# Patient Record
Sex: FEMALE | Race: WHITE
[De-identification: names, ages, dates, MRNs, and addresses within clinical notes are randomized per-mention and may not be internally consistent; named-entity substitution may affect disease eponyms.]

---

## 2019-08-13 NOTE — RADIOLOGY REPORT (SQ)
EXAM DESCRIPTION:

XR ANKLE 2 VIEWS



COMPLETED DATE/TME:  08/13/2019 00:00



CLINICAL HISTORY:

34 years Female, bone pain



COMPARISON:

None.



Findings:



Acute oblique fracture of the distal diametaphysis of the right

fibula with 0.3 cm lateral displacement. Mild diffuse right ankle

swelling.  Bones, joints, and soft tissues of the RIGHT XR ANKLE

2 VIEWS appear otherwise unremarkable.



IMPRESSION:

Acute oblique fracture of the distal diametaphysis of the right

fibula with 0.3 cm lateral displacement. Mild diffuse right ankle

swelling.

## 2019-08-13 NOTE — ER DOCUMENT REPORT
ED Extremity Problem, Lower





- General


Chief Complaint: Ankle Pain


Stated Complaint: ANKLE PAIN


Time Seen by Provider: 08/13/19 07:40


Notes: 





34-year-old female presents ER complaint of right ankle pain.  The patient broke

her ankle in the spring.  It is healed she is recently moved from [place].  

The patient started work and is been working 2 weeks and after work her ankle 

has been bothering her.  She had no specific injury or trauma describes it as 

aching.  It is worse with walking and movement better with rest and elevation.  

Denies any numbness or tingling.  Rates the pain is severe at times


TRAVEL OUTSIDE OF THE [place] IN LAST 30 DAYS: No





- Related Data


Allergies/Adverse Reactions: 


                                        





No Known Allergies Allergy (Unverified 08/13/19 07:53)


   











Past Medical History





- Social History


Smoking Status: Unknown if Ever Smoked


Family History: None


Patient has suicidal ideation: No


Patient has homicidal ideation: No


Renal/ Medical History: Denies: Hx Peritoneal Dialysis





Review of Systems





- Review of Systems


Constitutional: No symptoms reported


Gastrointestinal: No symptoms reported


Genitourinary: No symptoms reported


Musculoskeletal: No symptoms reported, Joint pain


Skin: No symptoms reported


Neurological/Psychological: denies: Headaches


-: Yes All other systems reviewed and negative





Physical Exam





- Vital signs


Vitals: 





                                        











Temp Pulse Resp BP Pulse Ox


 


 98 F   63   16   124/74   99 


 


 08/13/19 06:42  08/13/19 06:42  08/13/19 06:42  08/13/19 06:42  08/13/19 06:42














- Notes


Notes: 





GENERAL_APPEARANCE: well_nourished, alert, cooperative, no_acute_distress, 

no_obvious_discomfort.


 VITALS: reviewed, see vital signs table.


 HEAD: no_swelling\tenderness on the head.


 EYES:conjunctiva_clear.


 NOSE: no_nasal_discharge.


 MOUTH: (-)decreased moisture.


  EXTREMITIES: Right ankle does appear significant swollen there is a little bit

of puffiness around the right lateral malleolus.  Dorsalis pedis pulses are 

noted brisk capillary refill.


 SKIN: warm, dry, good_color, no_rash.


 MENTAL_STATUS: speech_clear, oriented_X_3, normal_affect, 

responds_appropriately to questions.








Course





- Re-evaluation


Re-evalutation: 





08/13/19 08:22


Patient has a history of fracture looks as if she has refractured along the old 

fracture line.  We will place her in a stirrup splint she has a orthopedic 

walking boot already I have her referred to orthopedics.  Will have her follow-

up with Ortho nonweightbearing with crutches until orthopedics sees her.





- Vital Signs


Vital signs: 





                                        











Temp Pulse Resp BP Pulse Ox


 


 98 F   63   16   124/74   99 


 


 08/13/19 06:42  08/13/19 06:42  08/13/19 06:42  08/13/19 06:42  08/13/19 06:42














- Diagnostic Test


Radiology reviewed: Reports reviewed


Radiology results interpreted by me: 





08/13/19 08:16





                                        





Ankle X-Ray  08/13/19 00:00


IMPRESSION:


Acute oblique fracture of the distal diametaphysis of the right


fibula with 0.3 cm lateral displacement. Mild diffuse right ankle


swelling.  


 














Discharge





- Discharge


Clinical Impression: 


Fracture of distal fibula


Qualifiers:


 Encounter type: initial encounter Fracture type: closed Fracture morphology: 

other fracture Laterality: right Qualified Code(s): S82.831A - Other fracture of

upper and lower end of right fibula, initial encounter for closed fracture





Condition: Good


Disposition: HOME, SELF-CARE


Instructions:  Fracture of Distal Fibula ([facility])


Prescriptions: 


Hydrocodone/Acetaminophen [Norco 5-325 mg Tablet] 1 tab PO TID PRN #12 tablet


 PRN Reason: Pain Scale Of 5


Forms:  Return to Work


Referrals: 


MACY PRESTON DO [ACTIVE STAFF] - Follow up as needed

## 2020-05-13 ENCOUNTER — HOSPITAL ENCOUNTER (EMERGENCY)
Dept: HOSPITAL 62 - ER | Age: 35
Discharge: HOME | End: 2020-05-13
Payer: SELF-PAY

## 2020-05-13 VITALS — SYSTOLIC BLOOD PRESSURE: 104 MMHG | DIASTOLIC BLOOD PRESSURE: 64 MMHG

## 2020-05-13 DIAGNOSIS — Z87.891: ICD-10-CM

## 2020-05-13 DIAGNOSIS — R53.1: ICD-10-CM

## 2020-05-13 DIAGNOSIS — Z79.3: ICD-10-CM

## 2020-05-13 DIAGNOSIS — N92.1: ICD-10-CM

## 2020-05-13 DIAGNOSIS — D62: Primary | ICD-10-CM

## 2020-05-13 DIAGNOSIS — Z79.899: ICD-10-CM

## 2020-05-13 LAB
ADD MANUAL DIFF: NO
ALBUMIN SERPL-MCNC: 3 G/DL (ref 3.5–5)
ALP SERPL-CCNC: 38 U/L (ref 38–126)
ANION GAP SERPL CALC-SCNC: 3 MMOL/L (ref 5–19)
AST SERPL-CCNC: 30 U/L (ref 14–36)
BASOPHILS # BLD AUTO: 0.1 10^3/UL (ref 0–0.2)
BASOPHILS NFR BLD AUTO: 0.9 % (ref 0–2)
BILIRUB DIRECT SERPL-MCNC: 0 MG/DL (ref 0–0.4)
BILIRUB SERPL-MCNC: 0.2 MG/DL (ref 0.2–1.3)
BUN SERPL-MCNC: 13 MG/DL (ref 7–20)
CALCIUM: 8.3 MG/DL (ref 8.4–10.2)
CHLORIDE SERPL-SCNC: 109 MMOL/L (ref 98–107)
CO2 SERPL-SCNC: 21 MMOL/L (ref 22–30)
EOSINOPHIL # BLD AUTO: 0.1 10^3/UL (ref 0–0.6)
EOSINOPHIL NFR BLD AUTO: 0.9 % (ref 0–6)
ERYTHROCYTE [DISTWIDTH] IN BLOOD BY AUTOMATED COUNT: 13.6 % (ref 11.5–14)
GLUCOSE SERPL-MCNC: 90 MG/DL (ref 75–110)
HCT VFR BLD CALC: 19.2 % (ref 36–47)
HGB BLD-MCNC: 6.7 G/DL (ref 12–15.5)
LYMPHOCYTES # BLD AUTO: 2.2 10^3/UL (ref 0.5–4.7)
LYMPHOCYTES NFR BLD AUTO: 32.2 % (ref 13–45)
MCH RBC QN AUTO: 31.8 PG (ref 27–33.4)
MCHC RBC AUTO-ENTMCNC: 35 G/DL (ref 32–36)
MCV RBC AUTO: 91 FL (ref 80–97)
MONOCYTES # BLD AUTO: 0.3 10^3/UL (ref 0.1–1.4)
MONOCYTES NFR BLD AUTO: 5 % (ref 3–13)
NEUTROPHILS # BLD AUTO: 4.1 10^3/UL (ref 1.7–8.2)
NEUTS SEG NFR BLD AUTO: 61 % (ref 42–78)
PLATELET # BLD: 221 10^3/UL (ref 150–450)
POTASSIUM SERPL-SCNC: 4.1 MMOL/L (ref 3.6–5)
PROT SERPL-MCNC: 5.7 G/DL (ref 6.3–8.2)
RBC # BLD AUTO: 2.12 10^6/UL (ref 3.72–5.28)
TOTAL CELLS COUNTED % (AUTO): 100 %
WBC # BLD AUTO: 6.7 10^3/UL (ref 4–10.5)

## 2020-05-13 PROCEDURE — 86850 RBC ANTIBODY SCREEN: CPT

## 2020-05-13 PROCEDURE — 99291 CRITICAL CARE FIRST HOUR: CPT

## 2020-05-13 PROCEDURE — 80053 COMPREHEN METABOLIC PANEL: CPT

## 2020-05-13 PROCEDURE — 86920 COMPATIBILITY TEST SPIN: CPT

## 2020-05-13 PROCEDURE — 84703 CHORIONIC GONADOTROPIN ASSAY: CPT

## 2020-05-13 PROCEDURE — 76830 TRANSVAGINAL US NON-OB: CPT

## 2020-05-13 PROCEDURE — 85025 COMPLETE CBC W/AUTO DIFF WBC: CPT

## 2020-05-13 PROCEDURE — 36415 COLL VENOUS BLD VENIPUNCTURE: CPT

## 2020-05-13 PROCEDURE — 36430 TRANSFUSION BLD/BLD COMPNT: CPT

## 2020-05-13 PROCEDURE — 86901 BLOOD TYPING SEROLOGIC RH(D): CPT

## 2020-05-13 PROCEDURE — 86900 BLOOD TYPING SEROLOGIC ABO: CPT

## 2020-05-13 PROCEDURE — P9016 RBC LEUKOCYTES REDUCED: HCPCS

## 2020-05-13 PROCEDURE — 93976 VASCULAR STUDY: CPT

## 2020-05-13 NOTE — PDOC CONSULTATION
Consultation


Consult Date: 20


Provider Consulted: JAMIE HERNANDEZ





History of Present Illness


History of Present Illness: 


BRANT RUSSO is a 35 year old female


with menorrhagia,severe. She reports having this issue for approximately 3 

years. She just arrive here in last week for [place] and was getting work up 

and treatment there with a GYN. She reports they had done and EMB which was 

negative. THey had placed her on COCPs and she was given instructions for 

tapering dose of 3 tabs per day/2 tabs per day/1 tab daily as needed for heavy 

vaginal bleeding. SHe has required transfusion previously for this problem and 

last transfusion was 2019. She states she take the OCP daily when not on a 

taper. She moved her last week and w/ all the changes and move activities: she 

forgot her OCP tablets and began bleeding Thursday (6 days ago). Bleeding became

heavy 4 days ago so she began her OCP taper. This helped until today and 

bleeding 15-20 pad over last 24 hrs with large clots. This is what brought her 

to the ED. She denies pain. Denies hx of abnormal PAP and reports is up to date 

on cervical cancer screening. She is  with hx of CS and Tubal ligation at 

last CS. 


Feels tired, weak and dizzy today but no syncopal episodes.





Social History


Smoking Status: Former Smoker





Family History


Family History: None


Parental Family History Reviewed: Yes


Children Family History Reviewed: Yes


Sibling(s) Family History Reviewed.: Yes





Medication/Allergy


Home Medications: 








Hydrocodone/Acetaminophen [Norco 5-325 mg Tablet] 1 tab PO TID PRN #12 tablet 

19 


Docusate Sodium [Colace] 100 mg PO Q12 30 Days #60 capsule 20 


Ferrous Sulfate [Gabriel-Time] 325 mg PO BID 30 Days #60 tablet 20 


Tranexamic Acid [Lysteda] 1,300 mg PO Q8 5 Days #30 tablet 20 








Allergies/Adverse Reactions: 


                                        





No Known Allergies Allergy (Verified 20 06:53)


   











Review of Systems


Constitutional: ABSENT: chills, fever(s), headache(s), weight gain, weight loss


Cardiovascular: ABSENT: chest pain, dyspnea on exertion, edema, orthropnea, 

palpitations


Respiratory: ABSENT: cough, hemoptysis


Gastrointestinal: ABSENT: abdominal pain, constipation, diarrhea, hematemesis, 

hematochezia, nausea, vomiting


Neurological: ABSENT: abnormal gait, abnormal speech, confusion, dizziness, 

focal weakness, syncope


Psychiatric: ABSENT: anxiety, depression, homidical ideation, suicidal ideation


Endocrine: ABSENT: cold intolerance, heat intolerance, polydipsia, polyuria


Hematologic/Lymphatic: ABSENT: easy bleeding, easy bruising





Physical Exam





- Physical Exam


Vital Signs: 


                                        











Temp Pulse Resp BP Pulse Ox


 


 97.9 F   78   21 H  101/58 L  100 


 


 20 11:05  20 10:42  20 11:05  20 11:05  20 11:05








                                 Intake & Output











 20





 06:59 06:59 06:59


 


Intake Total   1000


 


Balance   1000


 


Weight  88.451 kg 











General appearance: PRESENT: no acute distress, cooperative


Respiratory exam: PRESENT: clear to auscultation sukhi


Cardiovascular exam: PRESENT: RRR, +S1, +S2


GI/Abdominal exam: PRESENT: soft


Extremities exam: PRESENT: full ROM.  ABSENT: calf tenderness, clubbing, pedal 

edema


Neurological exam: PRESENT: alert, awake, oriented to person, oriented to place,

 oriented to time


Psychiatric exam: PRESENT: appropriate affect


Skin exam: PRESENT: dry, warm





Result


Laboratory Results: 


                                        





                                 20 07:57 





                                 20 07:57 





                                        











  20





  07:57 07:57 07:57


 


WBC  6.7  


 


RBC  2.12 L  


 


Hgb  6.7 L  


 


Hct  19.2 L  


 


MCV  91  


 


MCH  31.8  


 


MCHC  35.0  


 


RDW  13.6  


 


Plt Count  221  


 


Seg Neutrophils %  61.0  


 


Sodium   133.4 L 


 


Potassium   4.1 


 


Chloride   109 H 


 


Carbon Dioxide   21 L 


 


Anion Gap   3 L 


 


BUN   13 


 


Creatinine   0.75 


 


Est GFR ( Amer)   > 60 


 


Glucose   90 


 


Calcium   8.3 L 


 


Total Bilirubin   0.2 


 


AST   30 


 


Alkaline Phosphatase   38 


 


Total Protein   5.7 L 


 


Albumin   3.0 L 


 


Serum HCG, Qual   


 


Blood Type    O POSITIVE


 


Antibody Screen    NEGATIVE














  20





  07:57


 


WBC 


 


RBC 


 


Hgb 


 


Hct 


 


MCV 


 


MCH 


 


MCHC 


 


RDW 


 


Plt Count 


 


Seg Neutrophils % 


 


Sodium 


 


Potassium 


 


Chloride 


 


Carbon Dioxide 


 


Anion Gap 


 


BUN 


 


Creatinine 


 


Est GFR (African Amer) 


 


Glucose 


 


Calcium 


 


Total Bilirubin 


 


AST 


 


Alkaline Phosphatase 


 


Total Protein 


 


Albumin 


 


Serum HCG, Qual  NEGATIVE


 


Blood Type 


 


Antibody Screen 











Impressions: 


                                        





Transvaginal US  20 07:11


IMPRESSION:  1.  The endometrial canal is distended and filled with 

heterogeneous echogenic material and on Doppler there is evidence of a vascular 

pedicle that extends into the endometrial canal.  Differential considerations to

 consider include an endometrial polyp, an AVM, and in the proper clinical 

setting retained products of conception.


2.  Other findings as detailed above.


 














Assessment & Plan





- Diagnosis


(1) Menorrhagia


Qualifiers: 


   Menorrhagia type: with irregular cycle   Qualified Code(s): N92.1 - Excessive

 and frequent menstruation with irregular cycle   


Is this a current diagnosis for this admission?: Yes   





- Time


Critical Time spent with patient: 15-24 minutes


Medications reviewed and adjusted accordingly: Yes


Anticipated discharge: Home - 36 yo  with menorrhagia/metrorrhagia  -VSS,

 pulse normal and b/p normal -Exam with small amount of blood on pad and on clot

 the size of  quarter. No pain on palpation -Serum HCG negative -US showed 

heterogeneous material in cavity of uterus, likely blood. Doppler showed vessel 

in area which is questionable for polyp/fibroid.  -Discussed AUB : Menorrhagia 

iwth patient and causes. She has undergone EMB negative per patient. Discussed 

use of tranexamic acid for next 5 days PO. SHe is hemodynamically stable and no 

contraindicaitons. Risks and benefits reviewed. After 5 days, resume daily OCP\ 

D/c possible out patient hysteroscopy with possible D&C, myosure and endometrial

 ablation.  Recommend she take Iron 325 mg BID and colace 100 mg BID for next 30

 days.  Recommend f/u in my office [facility] with in next week. -Rxs sent.  -D/c with 

Oral [facility]





- Plan Summary


Plan Summary: 





As above

## 2020-05-13 NOTE — RADIOLOGY REPORT (SQ)
EXAM DESCRIPTION:  U/S NON OB PEL TV W/DOPPLER



IMAGES COMPLETED DATE/TIME:  5/13/2020 9:26 am



REASON FOR STUDY:  vaginal bleeding



COMPARISON:  None.



TECHNIQUE:  Dynamic and static grayscale images acquired of the pelvis via transvaginal approach and 
recorded on PACS. Additional selected color Doppler and spectral images recorded.



LIMITATIONS:  None.



FINDINGS:  LMP:

UTERUS: The uterus measures 7.7 x 4.9 x 4.8 cm.  The uterus is oriented retroverted.  The myometrium 
is heterogeneous.

ENDOMETRIAL STRIPE: The endometrial canal is distended and filled with heterogeneous echogenic materi
al.  On Doppler there is evidence of vascular pedicle that extends into the endometrial canal.

CERVIX: The cervix measures 2.7 cm in length.

RIGHT OVARY AND DOPPLER: The right ovary measures 1.9 x 1.8 x 0.9 cm and on Doppler there is intact a
rterial flow and venous outflow within the ovarian stroma.  There is no adnexal mass.

LEFT OVARY AND DOPPLER: The left ovary measures 2.2 x 1.9 x 1 cm and on Doppler there is intact arter
ial inflow and venous outflow within the ovarian stroma.  There is no adnexal mass.

FREE FLUID: None noted.

OTHER: No other finding.



IMPRESSION:  1.  The endometrial canal is distended and filled with heterogeneous echogenic material 
and on Doppler there is evidence of a vascular pedicle that extends into the endometrial canal.  Diff
erential considerations to consider include an endometrial polyp, an AVM, and in the proper clinical 
setting retained products of conception.

2.  Other findings as detailed above.



TECHNICAL DOCUMENTATION:  JOB ID:  [id number]

 2011 [employer]- All Rights Reserved                          Rev-5/18



Reading location - IP/workstation name: [email or web address]

## 2020-05-13 NOTE — ER DOCUMENT REPORT
ED GI/





- General


Mode of Arrival: Ambulatory


Information source: Patient


TRAVEL OUTSIDE OF THE [place] IN LAST 30 DAYS: No





- Related Data


Home Medications: BIRTH CONTROL





<KELVIN BERGER - Last Filed: 20 07:19>





<ERICK AYALA - Last Filed: 20 12:44>





- General


Chief Complaint: Vaginal Bleeding


Stated Complaint: VAGINAL BLEEDING


Time Seen by Provider: 20 06:46


Primary Care Provider: 


PASTORA HERNANDEZ MD [ACTIVE PROVISIONAL STAFF] - Follow up as needed


Notes: 





35-year-old female patient presenting to the emergency department a 5-day 

history of vaginal bleeding.  Patient reports she has a longstanding history of 

heavy vaginal bleeding.  She has never had a formal diagnosis of fibroids or 

anything to that extent.  She does report having a history of needing blood 

transfusions in relation to this problem.  Last blood transfusion was in 2018.

 Patient just moved here from [place], she does not have an OB/GYN.  She 

states she is gone through 15-20 pads/tampons per day.  Reports passing large 

clots.


 (KELVIN BERGER)





- Related Data


Allergies/Adverse Reactions: 


                                        





No Known Allergies Allergy (Verified 20 06:53)


   











Past Medical History





- General


Information source: Patient





- Social History


Smoking Status: Former Smoker


Frequency of alcohol use: Social


Drug Abuse: None


Family History: None


Patient has homicidal ideation: No





- Medical History


Medical History: Negative


Renal/ Medical History: Denies: Hx Peritoneal Dialysis


Past Surgical History: Reports: Hx  Section, Hx Tubal Ligation





- Immunizations


Immunizations up to date: Yes





<KELVIN BERGER - Last Filed: 20 07:19>





Review of Systems





- Review of Systems


Constitutional: Weakness


Female Genitourinary: Vaginal bleeding


-: Yes All other systems reviewed and negative





<KELVIN BERGER - Last Filed: 20 07:19>





Physical Exam





<KELVIN BERGER - Last Filed: 20 07:19>





- Vital signs


Vitals: 


                                        











Temp Pulse Resp BP Pulse Ox


 


 98.3 F   105 H  18   115/67   100 


 


 20 06:39  20 06:39  20 06:39  20 06:39  20 06:39














- Notes


Notes: 





PHYSICAL EXAMINATION:





GENERAL: Pale, well-nourished and in no acute distress.





HEAD: Atraumatic, normocephalic.





EYES: Pupils equal round and reactive to light, extraocular movements intact, 

conjunctiva are normal.





ENT: Nares patent, oropharynx clear without exudates.  Moist mucous membranes.





NECK: Normal range of motion, supple without lymphadenopathy





LUNGS: Breath sounds clear to auscultation bilaterally and equal.  No wheezes 

rales or rhonchi.





HEART: Regular rate and rhythm without murmurs





ABDOMEN: Soft, nontender, nondistended abdomen.  No guarding, no rebound.  No 

masses appreciated.





Female : Scant amount of vaginal bleeding in the vaginal vault, no active 

bleeding at this time.





Musculoskeletal: Normal range of motion, no pitting or edema.  No cyanosis.





NEUROLOGICAL: Cranial nerves grossly intact.  Normal speech, normal gait.  

Normal sensory, motor exams





PSYCH: Normal mood, normal affect.





SKIN: Warm, Dry, normal turgor, no rashes or lesions noted.  Normal external 

genitalia, no cervical motion or adnexal tenderness. (KELVIN BERGER)





Course





<KELVIN BERGER - Last Filed: 20 07:19>





- Laboratory


Result Diagrams: 


                                 20 07:57





                                 20 07:57





<ERICK AYALA - Last Filed: 20 12:44>





- Re-evaluation


Re-evalutation: 





Patient passed multiple large clots at the time of arrival, at the time of my 

exam she has no active bleeding.  Labs obtained, type and screen pending patient

will be sent for transvaginal ultrasound.  Patient stable at this time.


 (KELVIN BERGER)





20 08:00 


Report received on patient





20 08:23


patient H&H 6.7/19.  will order 2 units PRBC.


20 08:35


I spoke with the patient at length.  She is on birth control right now.  She 

does not have a local OB/GYN.  She is in agreement with the plan to transfuse 2 

units of blood, states she has been transfused in the past.  We discussed risks 

and benefits.  She verbalizes understanding.


20 10:28


I discussed evaluation and results with the patient.  She indicates she is not 

been pregnant in the last 11 years.  I did speak with Dr. Pastora Hernandez OB/GYN.  

She is delivering a baby currently but will take a look at the ultrasound and 

come down after the delivery to evaluate the patient


20 11:19


Patient was evaluated by Dr. Hernandez.  She will prescribe some medications for the 

patient as an outpatient and the patient will follow-up in the office to discuss

ablation or other procedures, does not need emergent D&C today. (ERICK AYALA)





- Vital Signs


Vital signs: 


                                        











Temp Pulse Resp BP Pulse Ox


 


 98.3 F   78   16   101/50 L  100 


 


 20 12:00  20 10:42  20 12:01  20 12:01  20 12:01














- Laboratory


Laboratory results interpreted by me: 


                                        











  20





  07:57 07:57 07:57


 


RBC  2.12 L  


 


Hgb  6.7 L  


 


Hct  19.2 L  


 


Sodium   133.4 L 


 


Chloride   109 H 


 


Carbon Dioxide   21 L 


 


Anion Gap   3 L 


 


Calcium   8.3 L 


 


Total Protein   5.7 L 


 


Albumin   3.0 L 


 


Crossmatch    See Detail














Critical Care Note





- Critical Care Note


Total time excluding time spent on procedures (mins): 50 - Severe anemia with 

vaginal bleeding necessitating consultations and blood transfusion





<ERICK AYALA - Last Filed: 20 12:44>





Discharge





<KELVIN BERGER - Last Filed: 20 07:19>





<ERICK AYALA - Last Filed: 20 12:44>





- Discharge


Clinical Impression: 


 Vaginal bleeding





Anemia


Qualifiers:


 Anemia type: other cause Other causes of anemia: acute posthemorrhagic 

Qualified Code(s): D62 - Acute posthemorrhagic anemia





Condition: Stable


Disposition: HOME, SELF-CARE


Instructions:  Vaginal Bleeding ([facility])


Additional Instructions: 


Follow-up with OB/GYN for further evaluation and treatment call tomorrow to 

obtain appointment within the next week for office appointment.  If you have 

significant worsening bleeding return to the emergency department for 

reevaluation as discussed.  Take the medications as prescribed by OB/GYN


Prescriptions: 


Docusate Sodium [Colace] 100 mg PO Q12 30 Days #60 capsule


Ferrous Sulfate [Gabriel-Time] 325 mg PO BID 30 Days #60 tablet


Tranexamic Acid [Lysteda] 1,300 mg PO Q8 5 Days #30 tablet


Referrals: 


PASTORA HERNANDEZ MD [ACTIVE PROVISIONAL STAFF] - Follow up as needed